# Patient Record
Sex: FEMALE | Race: WHITE | NOT HISPANIC OR LATINO | Employment: OTHER | URBAN - METROPOLITAN AREA
[De-identification: names, ages, dates, MRNs, and addresses within clinical notes are randomized per-mention and may not be internally consistent; named-entity substitution may affect disease eponyms.]

---

## 2017-02-01 ENCOUNTER — ALLSCRIPTS OFFICE VISIT (OUTPATIENT)
Dept: OTHER | Facility: OTHER | Age: 52
End: 2017-02-01

## 2017-04-05 ENCOUNTER — ALLSCRIPTS OFFICE VISIT (OUTPATIENT)
Dept: OTHER | Facility: OTHER | Age: 52
End: 2017-04-05

## 2017-05-10 ENCOUNTER — ALLSCRIPTS OFFICE VISIT (OUTPATIENT)
Dept: OTHER | Facility: OTHER | Age: 52
End: 2017-05-10

## 2017-08-28 ENCOUNTER — GENERIC CONVERSION - ENCOUNTER (OUTPATIENT)
Dept: OTHER | Facility: OTHER | Age: 52
End: 2017-08-28
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
## 2018-01-10 NOTE — MISCELLANEOUS
Message   Recorded as Task   Date: 08/25/2017 01:50 PM, Created By: Kyle Jordan   Task Name: Miscellaneous   Assigned To: SPA es clinical,Team   Regarding Patient: Fred Espinoza, Status: Active   Comment:    Kyle Jordan - 25 Aug 2017 1:50 PM     TASK CREATED  phone call from patient requesting a refill of gabapentin 600mg and cyclobenzaprine 10mg  patient has no refills left on gabapentin rx, and patient has about 12 pills let of cyclobenzaprine  please send rx to [facility] in [place], [phone]  if any questions can reach patient at [phone]  [place] Neat - 25 Aug 2017 1:53 PM     TASK IN PROGRESS   Hannah Santana - 25 Aug 2017 2:15 PM     TASK REASSIGNED: Previously Assigned To DANIEL Hassan Back - 25 Aug 2017 4:04 PM     TASK REPLIED TO: Previously Assigned To [place]  sent to her pharmacy   Hannah Santana - 25 Aug 2017 4:35 PM     TASK EDITED  S/w pt who states switched pharmacy to [facility] in [place]  She has enough medication to last til Monday  She is OK with not picking up script at [facility] and picking up at [facility] on monday when SI can resend order to correct pharmacy  Will update pharmacy in allscripts  Hannah Santana - 28 Aug 2017 2:05 PM     TASK EDITED  Please resend to pharmacy on file gabapentin 600mg and cyclobenzaprine 10mg  Pt is out of cyclobenzaprine today and called office Friday but incorrect pharmacy was on file  Annamaria Reaves - 28 Aug 2017 3:53 PM     TASK EDITED  phone call from patient requesting status of medication being resent to other pharmacy  please resend to pharmacy as patient is completely out  [place] - 69 Aug 2017 4:23 PM     TASK REPLIED TO: Previously Assigned To Lenin Poster already thank you   Hannah Santana - 28 Aug 2017 4:24 PM     TASK EDITED  pt informed of the same  Pt thankful and verbalized understanding  Active Problems    1   Bilateral low back pain with right-sided sciatica (724 3) (M54 41)   2  Chronic pain syndrome (338 4) (G89 4)   3  Chronic prescription opiate use (V58 69) (Z79 891)   4  Dizziness (780 4) (R42)   5  Gait abnormality (781 2) (R26 9)   6  History of left foot drop (V13 59) (Z87 39)   7  Lumbar facet arthropathy (721 3) (M12 88)   8  Lumbar radiculopathy (724 4) (M54 16)   9  Neck injury (959 09) (S19 9XXA)   10  Neck pain (723 1) (M54 2)   11  Post-concussion headache (339 20) (G44 309)   12  Post-concussion syndrome (310 2) (F07 81)   13  Posttraumatic vertigo (780 4) (R42)   14  Sleeping difficulty (780 50) (G47 9)   15  Spondylolisthesis of lumbar region (738 4) (M43 16)    Current Meds   1  Calcium 1500 MG TABS; TAKE 1 TABLET DAILY; Therapy: 79SSK6766 to Recorded   2  Cyclobenzaprine HCl - 10 MG Oral Tablet; TAKE 1 TABLET 3 TIMES DAILY AS NEEDED; Therapy: 27FXE9337 to (Christin Shultz)  Requested for: 21Uij1884; Last   Rx:74Jvl1857 Ordered   3  EpiPen 2-Marcus 0 3 MG/0 3ML Injection Solution Auto-injector; Therapy: 06ZJM3149 to Recorded   4  Gabapentin 600 MG Oral Tablet; TAKE 1 TABLET 4 TIMES DAILY; Therapy: 63XHA1470 to (Christin Shultz)  Requested for: 21Nei4368; Last   Rx:15Afr1495 Ordered   5  Multivitamins Oral Capsule; TAKE 1 CAPSULE DAILY; Therapy: 83PZZ0208 to Recorded   6  ProAir  (90 Base) MCG/ACT Inhalation Aerosol Solution; INHALE PUFFS  PRN; Therapy: 40ACQ1801 to Recorded   7  Synthroid 125 MCG Oral Tablet (Levothyroxine Sodium); TAKE 1 TABLET DAILY; Therapy: 31HEA1731 to Recorded   8  Topamax 50 MG Oral Tablet (Topiramate); Therapy: (Recorded:83Tpv2192) to Recorded    Allergies    1  Adhesive Tape TAPE   2  Atarax   3  Biofreeze GEL   4  CeleBREX CAPS   5  Contrast Media Ready-Box MISC   6  Domperidone POWD   7  Latex Gloves MISC   8  meclizine   9  NSAIDs   10  PABA Derivatives   11  Parabens   12  Reglan TABS   13  Sulfa Drugs   14  theophylline   15  Vioxx TABS   16  ZyrTEC Allergy TBDP    17   Animal dander - Dogs 18  Grass   19  Mold   20   Other    Signatures   Electronically signed by : Alexsander Edwards, ; Aug 28 2017  4:25PM EST                       (Author)

## 2018-01-11 NOTE — MISCELLANEOUS
Message  Patient spoke to the , she felt that I hung up on her on the phone, the last time a few weeks back,explained to her that I never hang up the phone anybody, and I had told the patient that she would need to have a repeat MRI scan of the brain and to discuss different treatment options when she sees me on her visit, patient understands that and is happy, she was once again told that she would need a repeat MRI scan and to discuss different treatment options and evaluation, I recently did a complete narrative on her, she is going to discuss with her  and call our office and make a follow-up appointment if she does not go for a second opinion with a different neurologist  Ramírez Mehta her that she was welcome to take a second opinion and discuss with the neurologist about repeating the MRI scan of the brain  And other treatment options        Signatures   Electronically signed by : Tali Noguera MD; May 19 2016  5:38PM EST                       (Author)

## 2018-01-11 NOTE — RESULT NOTES
Message   Recorded as Task   Date: 05/10/2016 04:09 PM, Created By: Nitin Infante   Task Name: Follow Up   Assigned To: Jessy Hutchins   Regarding Patient: Hao Handley, Status: Active   CommentJesenia Matos - 10 May 2016 4:09 PM     TASK CREATED  F/u procedure call completed  Pt had LESI #2 5/3  States 60% improvement in pain but then she fell yesterday due to episode of vertigo  Rates pain a 5 today  States she is following up with neurologist r/t vertigo   Has f/u appt 6/6   Hannah Santana - 10 May 2016 4:09 PM     TASK REASSIGNED: Previously Assigned To Kulwinder Ghotra - 10 May 2016 4:42 PM     TASK REPLIED TO: Previously Assigned To Ashutosh anderson f/u appt        Signatures   Electronically signed by : Alem Valente, ; May 11 2016  7:43AM EST                       (Author)

## 2018-01-11 NOTE — MISCELLANEOUS
Patient to resume physical therapy and gait safety evaluation      Electronically signed by:Michael Mitchell MD  Oct 27 2016  1:26PM EST

## 2018-01-11 NOTE — RESULT NOTES
Message   Recorded as Task   Date: 12/14/2016 07:47 AM, Created By: Kely Alvarado   Task Name: Follow Up   Assigned To: Kely Alvarado   Regarding Patient: Iman Lovelace, Status: Complete   Comment:    Carlota oLndon - 14 Dec 2016 7:47 AM     TASK CREATED  F/u procedure-R L2-L5 RFA on 12/13/16  Pt had a difficult night  C/o severe site soreness that she rates a 9/10  Site is clean and dry  No redness  No drainage  Site is swollen and painful to touch  No sunburn like sensation  Hydrocodone is not controlling her pain  Can she have something for breakthru pain, or something stronger? Has no f/u appoint  Kasie Lovell - 14 Dec 2016 1:01 PM     TASK REPLIED TO: Previously Assigned To Kasie Lovell  continue with hydrocodone, muscle relaxants and NSAID for now  use ice pack to supplement  Carlota London - 14 Dec 2016 1:42 PM     TASK EDITED  Dr Lesa Alfredo ok's taking an extra Hydrocodone daily prn for the next wk  Understands that she will run out sooner  She will continue muscle relaxants, nsaids, and will continue to use ice     Carlota London - 14 Dec 2016 1:42 PM     TASK COMPLETED        Signatures   Electronically signed by : Rosie Plaza, ; Dec 14 2016  1:42PM EST                       (Author)

## 2018-01-12 NOTE — MISCELLANEOUS
Message   Recorded as Task   Date: 06/14/2016 02:19 PM, Created By: Denis Fermin   Task Name: Care Coordination   Assigned To: SPA es clinical,Team   Regarding Patient: Iman Lovelace, Status: In Progress   Comment:    Monique Mata - 14 Jun 2016 2:19 PM     TASK CREATED  I sent a vm from [facility] to [place]In phone I think you Dr Lesa Alfredo or you need to listen to  Denis Fermin - 15 Bk 2016 11:06 AM     TASK REASSIGNED: Previously Assigned To SPA es clinical,Team  pt called back about pain medication also ther is a vm from her [facility] pharmacy on [address] that I think you should hear regarding pt  (Pts cb # [phone])   Georgi Rowan - 15 Bk 2016 11:30 AM     TASK EDITED  Contacted pt pharmacy who states that this medication was filled yesterday  Issue resolved        Active Problems    1  Bilateral low back pain with right-sided sciatica (724 3) (M54 41)   2  Chronic prescription opiate use (V58 69) (Z79 899)   3  Dizziness (780 4) (R42)   4  Lumbar facet arthropathy (721 3) (M46 96)   5  Lumbar radiculopathy (724 4) (M54 16)   6  Neck pain (723 1) (M54 2)   7  Post-concussion syndrome (310 2) (F07 81)   8  Posttraumatic vertigo (780 4) (R42)   9  Spondylolisthesis of lumbar region (738 4) (M43 16)    Current Meds   1  Acetaminophen-Codeine #3 300-30 MG Oral Tablet; TAKE 1 TABLET 3 TIMES A DAY AS   NEEDED FOR PAIN;   Therapy: 25SQG3957 to (Evaluate:35Drv2916); Last Rx:06Jun2016 Ordered   2  Amitriptyline HCl - 10 MG Oral Tablet; TAKE 1 TABLET AT BEDTIME; Therapy: 12KRU6477 to (Evaluate:13Jun2016)  Requested for: 28OUH1211; Last   Rx:15Mar2016 Ordered   3  Calcium 1500 MG TABS; TAKE 1 TABLET DAILY; Therapy: 44WMA9206 to Recorded   4  Claritin-D 24 Hour  MG Oral Tablet Extended Release 24 Hour; TAKE 1 TABLET   DAILY AS DIRECTED; Therapy: (Recorded:49Lsh7668) to Recorded   5  Diazepam 5 MG Oral Tablet; TAKE 1 TABLET TWICE DAILY; Therapy: 58JEQ8146 to Recorded   6   EpiPen 2-Marcus 0 3 MG/0 3ML Injection Solution Auto-injector; Therapy: 24PCJ6089 to Recorded   7  Gabapentin 100 MG Oral Capsule; TAKE ONE CAPSULE BY MOUTH AT BEDTIME; Therapy: 68GBA9881 to (Evaluate:15Jun2016)  Requested for: 50Ncp7047; Last   Rx:22Apr2016 Ordered   8  Methocarbamol 750 MG Oral Tablet; TAKE 1 TABLET 4 TIMES DAILY; Therapy: 11OSR3609 to (Evaluate:22Lys9710)  Requested for: 51KBV6270; Last   Rx:43Mxd2527 Ordered   9  Multivitamins Oral Capsule; TAKE 1 CAPSULE DAILY; Therapy: 05KMZ2067 to Recorded   10  ProAir  (90 Base) MCG/ACT Inhalation Aerosol Solution; INHALE PUFFS  PRN; Therapy: 31NPJ1391 to Recorded   11  Synthroid 125 MCG Oral Tablet (Levothyroxine Sodium); TAKE 1 TABLET DAILY; Therapy: 57BTG1163 to Recorded    Allergies    1  Adhesive Tape TAPE   2  Atarax   3  Biofreeze GEL   4  CeleBREX CAPS   5  Contrast Media Ready-Box MISC   6  Domperidone POWD   7  Latex Gloves MISC   8  meclizine   9  NSAIDs   10  PABA Derivatives   11  Parabens   12  Reglan TABS   13  Sulfa Drugs   14  theophylline   15  Vioxx TABS   16  ZyrTEC Allergy TBDP    17  Animal dander - Dogs   18  Grass   19  Mold   20   Other    Signatures   Electronically signed by : Patrick Patel, ; Bk 15 2016 11:30AM EST                       (Author)
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
## 2018-01-12 NOTE — MISCELLANEOUS
Message   Recorded as Task   Date: 06/14/2016 01:19 PM, Created By: Juancarlos March   Task Name: Med Renewal Request   Assigned To: SPA es clinical,Team   Regarding Patient: Maria E Curiel, Status: In Progress   Comment:    Monique Mata - 14 Jun 2016 1:19 PM     TASK CREATED  needs a new Rx for June for her pain meds she forgot to get one when she was here last week cb# [phone]   Bobmickie Gaby - 14 Jun 2016 1:43 PM     TASK EDITED  Attempted to contact pt  LMOM advising pt to call back   Georgi Rowan - 16 Jun 2016 10:09 AM     TASK EDITED  F/U  Pt s/p LESI #3 performed on 6/9/16 by Dr Lobito Lopez  LMOM advising pt to call back regarding F/u and previous message attempt for medication  Georgi Rowan - 16 Jun 2016 3:35 PM     TASK EDITED  Received call back from pt who states that she received no relief from her LESI procedure  No s/s of infection noted  Pt reports current pain level of 8/10  Pt requesting refill of tylenol 3 for June  Spoke with Dr Lobito Lopez who states he can refill medication for pt for June  Pt must bring in July and August scripts of this medication to exchange for a new script  Pt made aware  Pt states she will be in today to  June script and will come back tomorrow to return July and August scripts  Pt also requesting an adjusted script for Amitryptiline as she states Dr Lobito Lopez advised her to double up on her current dose q HS  Pt states she needs a n ew script indicating 2 tab HS  Please advise   Liz Anand - 16 Jun 2016 3:57 PM     TASK REPLIED TO: Previously Assigned To Liz Anand  she may  script for tynelol #3 TIDX 1 MONTH supply  Georgi Rowan - 17 Jun 2016 10:20 AM     TASK EDITED  Spoke to pt and informed her of above  Pt picked up script for june  Pts states she will be in on 6/17 to return scripts for July and August and  new scripts of tylenol #3 for these months  Pt also requesting refill of amitriptyline for double dose HS      Please advise   Janiya Rodriguez - 17 Jun 2016 12:54 PM     TASK REPLIED TO: Previously Assigned To Janiya winston md aware  elavil 50mg po qhs sent to her pharmacy   Georgi Rowan - 17 Jun 2016 1:16 PM     TASK EDITED  Spoke to pt and informed her of refill for elavil being sent to her pharmacy  Pt verbalizes understanding  Active Problems    1  Bilateral low back pain with right-sided sciatica (724 3) (M54 41)   2  Chronic prescription opiate use (V58 69) (Z79 899)   3  Dizziness (780 4) (R42)   4  Lumbar facet arthropathy (721 3) (M46 96)   5  Lumbar radiculopathy (724 4) (M54 16)   6  Neck pain (723 1) (M54 2)   7  Post-concussion syndrome (310 2) (F07 81)   8  Posttraumatic vertigo (780 4) (R42)   9  Spondylolisthesis of lumbar region (738 4) (M43 16)    Current Meds   1  Acetaminophen-Codeine #3 300-30 MG Oral Tablet; TAKE 1 TABLET 3 TIMES A DAY AS   NEEDED FOR PAIN;   Therapy: 45AOS8879 to (Evaluate:46Kft4564); Last Rx:16Jun2016 Ordered   2  Amitriptyline HCl - 25 MG Oral Tablet; TAKE 1 TABLET AT BEDTIME; Therapy: 48CJH2774 to (Evaluate:16Dan5438)  Requested for: 22KIK9314; Last   Rx:17Jun2016 Ordered   3  Calcium 1500 MG TABS; TAKE 1 TABLET DAILY; Therapy: 45OAO3516 to Recorded   4  Claritin-D 24 Hour  MG Oral Tablet Extended Release 24 Hour; TAKE 1 TABLET   DAILY AS DIRECTED; Therapy: (Recorded:39Hvt2660) to Recorded   5  Diazepam 5 MG Oral Tablet; TAKE 1 TABLET TWICE DAILY; Therapy: 71ESM5329 to Recorded   6  EpiPen 2-Marcus 0 3 MG/0 3ML Injection Solution Auto-injector; Therapy: 11JQA5227 to Recorded   7  Gabapentin 100 MG Oral Capsule; TAKE ONE CAPSULE BY MOUTH AT BEDTIME; Therapy: 08UUX3729 to (Evaluate:15Jun2016)  Requested for: 22Apr2016; Last   Rx:22Apr2016 Ordered   8  Methocarbamol 750 MG Oral Tablet; TAKE 1 TABLET 4 TIMES DAILY; Therapy: 73HJC4593 to (Evaluate:16Jun2016)  Requested for: 57NEZ4813; Last   Rx:18Mar2016 Ordered   9   Multivitamins Oral Capsule; TAKE 1 CAPSULE DAILY; Therapy: 56DEI8305 to Recorded   10  ProAir  (90 Base) MCG/ACT Inhalation Aerosol Solution; INHALE PUFFS  PRN; Therapy: 87RDV4228 to Recorded   11  Synthroid 125 MCG Oral Tablet (Levothyroxine Sodium); TAKE 1 TABLET DAILY; Therapy: 59PZB0779 to Recorded    Allergies    1  Adhesive Tape TAPE   2  Atarax   3  Biofreeze GEL   4  CeleBREX CAPS   5  Contrast Media Ready-Box MISC   6  Domperidone POWD   7  Latex Gloves MISC   8  meclizine   9  NSAIDs   10  PABA Derivatives   11  Parabens   12  Reglan TABS   13  Sulfa Drugs   14  theophylline   15  Vioxx TABS   16  ZyrTEC Allergy TBDP    17  Animal dander - Dogs   18  Grass   19  Mold   20   Other    Signatures   Electronically signed by : Jaswinder Stapleton, ; Jun 17 2016  1:16PM EST                       (Author)

## 2018-01-12 NOTE — MISCELLANEOUS
Patient advised not to drive for now secondary to gait difficulties      Electronically signed by:Raj Karolyn Aase MD  Oct  4 2016  3:46PM EST

## 2018-01-12 NOTE — MISCELLANEOUS
Message  Discussed with patient blood work results advised patient to follow-up with family physician regarding abnormal blood work,      Signatures   Electronically signed by : Teri Murguia MD; Jul 26 2016  5:07PM EST                       (Author)
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
## 2018-01-13 VITALS
SYSTOLIC BLOOD PRESSURE: 126 MMHG | HEIGHT: 60 IN | DIASTOLIC BLOOD PRESSURE: 78 MMHG | HEART RATE: 76 BPM | WEIGHT: 179 LBS | BODY MASS INDEX: 35.14 KG/M2

## 2018-01-14 VITALS
SYSTOLIC BLOOD PRESSURE: 122 MMHG | WEIGHT: 173 LBS | RESPIRATION RATE: 14 BRPM | HEART RATE: 100 BPM | HEIGHT: 60 IN | DIASTOLIC BLOOD PRESSURE: 78 MMHG | BODY MASS INDEX: 33.96 KG/M2

## 2018-01-14 VITALS
HEIGHT: 60 IN | WEIGHT: 177 LBS | HEART RATE: 92 BPM | SYSTOLIC BLOOD PRESSURE: 126 MMHG | BODY MASS INDEX: 34.75 KG/M2 | DIASTOLIC BLOOD PRESSURE: 78 MMHG
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
## 2018-01-16 NOTE — MISCELLANEOUS
Message   Recorded as Task   Date: 06/16/2016 03:03 PM, Created By: Jessica Reed   Task Name: Med Renewal Request   Assigned To: [facility] clinical,Team   Regarding Patient: Fred Espinoza, Status: In Progress   Comment:    Monique Mata - 16 Jun 2016 3:03 PM     TASK CREATED  pt wants a cb states she only has 9 pills left and needs a new Rx for this month and noone has called her back cb# [phone]   [facility] - 16 Jun 2016 3:35 PM     TASK EDITED  Addressed in alternate task        Active Problems    1  Bilateral low back pain with right-sided sciatica (724 3) (M54 41)   2  Chronic prescription opiate use (V58 69) (Z79 899)   3  Dizziness (780 4) (R42)   4  Lumbar facet arthropathy (721 3) (M46 96)   5  Lumbar radiculopathy (724 4) (M54 16)   6  Neck pain (723 1) (M54 2)   7  Post-concussion syndrome (310 2) (F07 81)   8  Posttraumatic vertigo (780 4) (R42)   9  Spondylolisthesis of lumbar region (738 4) (M43 16)    Current Meds   1  Acetaminophen-Codeine #3 300-30 MG Oral Tablet; TAKE 1 TABLET 3 TIMES A DAY AS   NEEDED FOR PAIN;   Therapy: 19UVY0408 to (Evaluate:92Buy8507); Last Rx:16Jun2016 Ordered   2  Amitriptyline HCl - 10 MG Oral Tablet; TAKE 1 TABLET AT BEDTIME; Therapy: 26STO8471 to (Evaluate:13Jun2016)  Requested for: 21AGY0398; Last   Rx:15Mar2016 Ordered   3  Calcium 1500 MG TABS; TAKE 1 TABLET DAILY; Therapy: 01DWQ7647 to Recorded   4  Claritin-D 24 Hour  MG Oral Tablet Extended Release 24 Hour; TAKE 1 TABLET   DAILY AS DIRECTED; Therapy: (Recorded:21Zma4760) to Recorded   5  Diazepam 5 MG Oral Tablet; TAKE 1 TABLET TWICE DAILY; Therapy: 36QLT3504 to Recorded   6  EpiPen 2-Marcus 0 3 MG/0 3ML Injection Solution Auto-injector; Therapy: 55LUP4957 to Recorded   7  Gabapentin 100 MG Oral Capsule; TAKE ONE CAPSULE BY MOUTH AT BEDTIME; Therapy: 80YLT8639 to (Evaluate:15Jun2016)  Requested for: 22Apr2016; Last   Rx:22Apr2016 Ordered   8   Methocarbamol 750 MG Oral Tablet; TAKE 1 TABLET 4 TIMES DAILY; Therapy: 28XJQ4384 to (Evaluate:16Jun2016)  Requested for: 18JIH7245; Last   Rx:18Mar2016 Ordered   9  Multivitamins Oral Capsule; TAKE 1 CAPSULE DAILY; Therapy: 59RJU1230 to Recorded   10  ProAir  (90 Base) MCG/ACT Inhalation Aerosol Solution; INHALE PUFFS  PRN; Therapy: 81JWD8594 to Recorded   11  Synthroid 125 MCG Oral Tablet (Levothyroxine Sodium); TAKE 1 TABLET DAILY; Therapy: 44NIK4094 to Recorded    Allergies    1  Adhesive Tape TAPE   2  Atarax   3  Biofreeze GEL   4  CeleBREX CAPS   5  Contrast Media Ready-Box MISC   6  Domperidone POWD   7  Latex Gloves MISC   8  meclizine   9  NSAIDs   10  PABA Derivatives   11  Parabens   12  Reglan TABS   13  Sulfa Drugs   14  theophylline   15  Vioxx TABS   16  ZyrTEC Allergy TBDP    17  Animal dander - Dogs   18  Grass   19  Mold   20   Other    Signatures   Electronically signed by : Nyla Schaumann, ; Jun 16 2016  3:36PM EST                       (Author)

## 2018-01-17 NOTE — RESULT NOTES
Verified Results  (1) COMPREHENSIVE METABOLIC PANEL 12RSF4801 57:25AA Charlotte Borja   REPORT COMMENT:  FASTING:YES     Test Name Result Flag Reference   GLUCOSE 91 mg/dL  65-99   Fasting reference interval   UREA NITROGEN (BUN) 14 mg/dL  7-25   CREATININE 0 57 mg/dL  0 50-1 05   For patients >52years of age, the reference limit  for Creatinine is approximately 13% higher for people  identified as -American  eGFR NON-AFR   AMERICAN 107 mL/min/1 73m2  > OR = 60   eGFR AFRICAN AMERICAN 124 mL/min/1 73m2  > OR = 60   BUN/CREATININE RATIO   6-31   NOT APPLICABLE (calc)   SODIUM 137 mmol/L  135-146   POTASSIUM 4 6 mmol/L  3 5-5 3   CHLORIDE 103 mmol/L     CARBON DIOXIDE 24 mmol/L  19-30   CALCIUM 9 6 mg/dL  8 6-10 4   PROTEIN, TOTAL 7 2 g/dL  6 1-8 1   ALBUMIN 4 2 g/dL  3 6-5 1   GLOBULIN 3 0 g/dL (calc)  1 9-3 7   ALBUMIN/GLOBULIN RATIO 1 4 (calc)  1 0-2 5   BILIRUBIN, TOTAL 0 6 mg/dL  0 2-1 2   ALKALINE PHOSPHATASE 66 U/L     AST 23 U/L  10-35   ALT 26 U/L  6-29

## 2019-02-28 ENCOUNTER — APPOINTMENT (EMERGENCY)
Dept: RADIOLOGY | Facility: HOSPITAL | Age: 54
End: 2019-02-28
Payer: COMMERCIAL

## 2019-02-28 ENCOUNTER — HOSPITAL ENCOUNTER (EMERGENCY)
Facility: HOSPITAL | Age: 54
Discharge: HOME | End: 2019-02-28
Attending: EMERGENCY MEDICINE
Payer: COMMERCIAL

## 2019-02-28 ENCOUNTER — APPOINTMENT (EMERGENCY)
Dept: CT IMAGING | Facility: HOSPITAL | Age: 54
End: 2019-02-28
Payer: COMMERCIAL

## 2019-02-28 VITALS
HEART RATE: 75 BPM | TEMPERATURE: 98.3 F | HEIGHT: 60 IN | DIASTOLIC BLOOD PRESSURE: 61 MMHG | SYSTOLIC BLOOD PRESSURE: 138 MMHG | WEIGHT: 174.16 LBS | OXYGEN SATURATION: 99 % | RESPIRATION RATE: 20 BRPM | BODY MASS INDEX: 34.19 KG/M2

## 2019-02-28 DIAGNOSIS — R56.9 PSEUDOSEIZURES (HCC): ICD-10-CM

## 2019-02-28 DIAGNOSIS — R56.9 SEIZURE-LIKE ACTIVITY (HCC): Primary | ICD-10-CM

## 2019-02-28 LAB
ALBUMIN SERPL BCP-MCNC: 3.7 G/DL (ref 3.5–5)
ALP SERPL-CCNC: 108 U/L (ref 46–116)
ALT SERPL W P-5'-P-CCNC: 30 U/L (ref 12–78)
AMPHETAMINES SERPL QL SCN: NEGATIVE
ANION GAP SERPL CALCULATED.3IONS-SCNC: 8 MMOL/L (ref 4–13)
APTT PPP: 22 SECONDS (ref 26–38)
AST SERPL W P-5'-P-CCNC: 20 U/L (ref 5–45)
ATRIAL RATE: 85 BPM
BACTERIA UR QL AUTO: ABNORMAL /HPF
BARBITURATES UR QL: NEGATIVE
BASOPHILS # BLD AUTO: 0.03 THOUSANDS/ΜL (ref 0–0.1)
BASOPHILS NFR BLD AUTO: 0 % (ref 0–1)
BENZODIAZ UR QL: NEGATIVE
BILIRUB DIRECT SERPL-MCNC: 0.17 MG/DL (ref 0–0.2)
BILIRUB SERPL-MCNC: 0.6 MG/DL (ref 0.2–1)
BILIRUB UR QL STRIP: NEGATIVE
BUN SERPL-MCNC: 19 MG/DL (ref 5–25)
CALCIUM SERPL-MCNC: 9.4 MG/DL (ref 8.3–10.1)
CHLORIDE SERPL-SCNC: 105 MMOL/L (ref 100–108)
CLARITY UR: ABNORMAL
CO2 SERPL-SCNC: 29 MMOL/L (ref 21–32)
COCAINE UR QL: NEGATIVE
COLOR UR: YELLOW
CREAT SERPL-MCNC: 0.72 MG/DL (ref 0.6–1.3)
EOSINOPHIL # BLD AUTO: 0.11 THOUSAND/ΜL (ref 0–0.61)
EOSINOPHIL NFR BLD AUTO: 1 % (ref 0–6)
ERYTHROCYTE [DISTWIDTH] IN BLOOD BY AUTOMATED COUNT: 13 % (ref 11.6–15.1)
GFR SERPL CREATININE-BSD FRML MDRD: 95 ML/MIN/1.73SQ M
GLUCOSE SERPL-MCNC: 95 MG/DL (ref 65–140)
GLUCOSE UR STRIP-MCNC: NEGATIVE MG/DL
HCT VFR BLD AUTO: 45.6 % (ref 34.8–46.1)
HGB BLD-MCNC: 14.5 G/DL (ref 11.5–15.4)
HGB UR QL STRIP.AUTO: NEGATIVE
IMM GRANULOCYTES # BLD AUTO: 0.02 THOUSAND/UL (ref 0–0.2)
IMM GRANULOCYTES NFR BLD AUTO: 0 % (ref 0–2)
INR PPP: 0.87 (ref 0.86–1.17)
KETONES UR STRIP-MCNC: ABNORMAL MG/DL
LACTATE SERPL-SCNC: 1.7 MMOL/L (ref 0.5–2)
LEUKOCYTE ESTERASE UR QL STRIP: ABNORMAL
LYMPHOCYTES # BLD AUTO: 2.72 THOUSANDS/ΜL (ref 0.6–4.47)
LYMPHOCYTES NFR BLD AUTO: 31 % (ref 14–44)
MAGNESIUM SERPL-MCNC: 2.3 MG/DL (ref 1.6–2.6)
MCH RBC QN AUTO: 29.7 PG (ref 26.8–34.3)
MCHC RBC AUTO-ENTMCNC: 31.8 G/DL (ref 31.4–37.4)
MCV RBC AUTO: 93 FL (ref 82–98)
METHADONE UR QL: NEGATIVE
MONOCYTES # BLD AUTO: 0.46 THOUSAND/ΜL (ref 0.17–1.22)
MONOCYTES NFR BLD AUTO: 5 % (ref 4–12)
NEUTROPHILS # BLD AUTO: 5.53 THOUSANDS/ΜL (ref 1.85–7.62)
NEUTS SEG NFR BLD AUTO: 63 % (ref 43–75)
NITRITE UR QL STRIP: NEGATIVE
NON-SQ EPI CELLS URNS QL MICRO: ABNORMAL /HPF
NRBC BLD AUTO-RTO: 0 /100 WBCS
OPIATES UR QL SCN: NEGATIVE
P AXIS: 56 DEGREES
PCP UR QL: NEGATIVE
PH UR STRIP.AUTO: 5.5 [PH] (ref 4.5–8)
PLATELET # BLD AUTO: 305 THOUSANDS/UL (ref 149–390)
PMV BLD AUTO: 9.4 FL (ref 8.9–12.7)
POTASSIUM SERPL-SCNC: 4.2 MMOL/L (ref 3.5–5.3)
PR INTERVAL: 142 MS
PROT SERPL-MCNC: 7.9 G/DL (ref 6.4–8.2)
PROT UR STRIP-MCNC: NEGATIVE MG/DL
PROTHROMBIN TIME: 11.8 SECONDS (ref 11.8–14.2)
QRS AXIS: 11 DEGREES
QRSD INTERVAL: 74 MS
QT INTERVAL: 346 MS
QTC INTERVAL: 411 MS
RBC # BLD AUTO: 4.89 MILLION/UL (ref 3.81–5.12)
RBC #/AREA URNS AUTO: ABNORMAL /HPF
SODIUM SERPL-SCNC: 142 MMOL/L (ref 136–145)
SP GR UR STRIP.AUTO: >=1.03 (ref 1–1.03)
T WAVE AXIS: 47 DEGREES
THC UR QL: POSITIVE
TROPONIN I SERPL-MCNC: <0.02 NG/ML
UROBILINOGEN UR QL STRIP.AUTO: 0.2 E.U./DL
VENTRICULAR RATE: 85 BPM
WBC # BLD AUTO: 8.87 THOUSAND/UL (ref 4.31–10.16)
WBC #/AREA URNS AUTO: ABNORMAL /HPF

## 2019-02-28 PROCEDURE — 36415 COLL VENOUS BLD VENIPUNCTURE: CPT | Performed by: EMERGENCY MEDICINE

## 2019-02-28 PROCEDURE — 83605 ASSAY OF LACTIC ACID: CPT | Performed by: EMERGENCY MEDICINE

## 2019-02-28 PROCEDURE — 83735 ASSAY OF MAGNESIUM: CPT | Performed by: EMERGENCY MEDICINE

## 2019-02-28 PROCEDURE — 80307 DRUG TEST PRSMV CHEM ANLYZR: CPT | Performed by: EMERGENCY MEDICINE

## 2019-02-28 PROCEDURE — 80076 HEPATIC FUNCTION PANEL: CPT | Performed by: EMERGENCY MEDICINE

## 2019-02-28 PROCEDURE — 80048 BASIC METABOLIC PNL TOTAL CA: CPT | Performed by: EMERGENCY MEDICINE

## 2019-02-28 PROCEDURE — 85025 COMPLETE CBC W/AUTO DIFF WBC: CPT | Performed by: EMERGENCY MEDICINE

## 2019-02-28 PROCEDURE — 99285 EMERGENCY DEPT VISIT HI MDM: CPT

## 2019-02-28 PROCEDURE — 71046 X-RAY EXAM CHEST 2 VIEWS: CPT

## 2019-02-28 PROCEDURE — 81001 URINALYSIS AUTO W/SCOPE: CPT | Performed by: EMERGENCY MEDICINE

## 2019-02-28 PROCEDURE — 93010 ELECTROCARDIOGRAM REPORT: CPT | Performed by: INTERNAL MEDICINE

## 2019-02-28 PROCEDURE — 85730 THROMBOPLASTIN TIME PARTIAL: CPT | Performed by: EMERGENCY MEDICINE

## 2019-02-28 PROCEDURE — 70450 CT HEAD/BRAIN W/O DYE: CPT

## 2019-02-28 PROCEDURE — 85610 PROTHROMBIN TIME: CPT | Performed by: EMERGENCY MEDICINE

## 2019-02-28 PROCEDURE — 93005 ELECTROCARDIOGRAM TRACING: CPT

## 2019-02-28 PROCEDURE — 84484 ASSAY OF TROPONIN QUANT: CPT | Performed by: EMERGENCY MEDICINE

## 2019-02-28 PROCEDURE — 96360 HYDRATION IV INFUSION INIT: CPT

## 2019-02-28 RX ORDER — DIAZEPAM 5 MG/1
5 TABLET ORAL EVERY 8 HOURS PRN
Qty: 10 TABLET | Refills: 0 | Status: SHIPPED | OUTPATIENT
Start: 2019-02-28 | End: 2020-03-03

## 2019-02-28 RX ORDER — DIAZEPAM 5 MG/1
5 TABLET ORAL ONCE
Status: COMPLETED | OUTPATIENT
Start: 2019-02-28 | End: 2019-02-28

## 2019-02-28 RX ORDER — AMITRIPTYLINE HYDROCHLORIDE 100 MG/1
100 TABLET, FILM COATED ORAL
Qty: 20 TABLET | Refills: 0 | Status: SHIPPED | OUTPATIENT
Start: 2019-02-28 | End: 2020-03-03

## 2019-02-28 RX ORDER — CYCLOBENZAPRINE HCL 10 MG
10 TABLET ORAL 3 TIMES DAILY PRN
COMMUNITY
End: 2020-03-03

## 2019-02-28 RX ADMIN — DIAZEPAM 5 MG: 5 TABLET ORAL at 13:52

## 2019-02-28 RX ADMIN — SODIUM CHLORIDE 1000 ML: 0.9 INJECTION, SOLUTION INTRAVENOUS at 14:07

## 2019-02-28 NOTE — ED PROVIDER NOTES
History  Chief Complaint   Patient presents with    Seizure - Prior Hx Of     EMS reports pt had approximately 6 seizures prior to their arrival and 3 witnessed seizures during their transport  Patient is a 79-year-old female with past medical history of hypothyroidism, chronic low back pain, GERD, obstructive sleep apnea, prior TBI/concussion with resulting pseudoseizures, presents to the emergency department for seizure like activity that has been uncontrolled today  According to EMS, patient was at an urgent care center and had multiple seizure like activity while there  Each seizure would last several seconds but always under 1 minutes and patient returned to her baseline almost immediately after the seizure  Per EMS, they witness 3 of these seizures pre-hospital and she was never postictal   They lifted up her arm up over her head during 1 of the seizures and dropped her arm however patient avoided it hitting her head  Since being in the ED, patient continues to have this type of seizure like activity on and off in which she has had shaking, bilateral upper and lower extremity shaking, worse on the right side  During my evaluation patient had multiple episodes of this and always return to her baseline when it stopped and was able to answer questions appropriately and immediately after the seizure like activity ceased  She was also withdrawing to pain during these seizures  She does complain of headache and states this is typical after she has a seizure  Has been and patient deny that there is ever been any incontinence, tongue biting or foaming at the mouth during any of these seizures  She denies any recent head injury  She reports her right arm is weak and she also has tingling in the right arm and right face  He reports this is typical when she has her seizures  Denies prior history of stroke    She denies any dizziness, visual disturbance or eye pain, neck or back pain worse than normal, chest pain, palpitations, dyspnea, recent URI symptoms or cough, recent fever or chills, abdominal pain, nausea, vomiting, change in bowel habits, urinary symptoms, slurring of speech or facial asymmetry  Patient follows with a neurologist at [address] [place] [address] and according to her  has had extensive neurologic workup with both normal EEG, continuous EEG and video monitored EEG  There is never been epileptic activity and patient diagnosed with nonepileptic seizures  She is on Keppra that was recently started and she takes 1000 mg b i d  She states she used to be on Topamax and states her seizures were more well controlled on that  She takes gabapentin 600 mg 4 times daily  History provided by:  Patient   used: No    Seizure - Prior Hx Of       Prior to Admission Medications   Prescriptions Last Dose Informant Patient Reported? Taking? HYDROcodone-acetaminophen (NORCO) 5-325 mg per tablet Not Taking at Unknown time  Yes No   Sig: Take 1 tablet by mouth 2 (two) times a day  amitriptyline (ELAVIL) 25 mg tablet   Yes No   Sig: Take 50 mg by mouth daily at bedtime  cyclobenzaprine (FLEXERIL) 10 mg tablet   Yes Yes   Sig: Take 10 mg by mouth 3 (three) times a day as needed for muscle spasms   diazepam (VALIUM) 5 mg tablet Not Taking at Unknown time  Yes No   Sig: Take 5 mg by mouth 2 (two) times a day    gabapentin (NEURONTIN) 100 mg capsule   Yes No   Sig: Take 200 mg by mouth daily at bedtime  levothyroxine 125 mcg tablet   Yes No   Sig: Take 125 mcg by mouth daily  methocarbamol (ROBAXIN) 750 mg tablet Not Taking at Unknown time  Yes No   Sig: Take 750 mg by mouth 4 (four) times a day        Facility-Administered Medications: None       Past Medical History:   Diagnosis Date    Back injury     Chronic low back pain     GERD (gastroesophageal reflux disease)     Hypothyroid     MARIA LUISA (obstructive sleep apnea)     Post concussive syndrome     Post-concussion vertigo     Prediabetes        Past Surgical History:   Procedure Laterality Date    CHOLECYSTECTOMY      GASTRIC BYPASS      KNEE ARTHROSCOPY W/ MENISCAL REPAIR Right     NEUROPLASTY / TRANSPOSITION ULNAR NERVE AT ELBOW      THYROIDECTOMY      TONSILLECTOMY         Family History   Problem Relation Age of Onset    Arthritis Mother     Coronary artery disease Father     Hypertension Father     Diabetes Maternal Grandmother      I have reviewed and agree with the history as documented  Social History     Tobacco Use    Smoking status: Never Smoker    Smokeless tobacco: Never Used   Substance Use Topics    Alcohol use: No    Drug use: No        Review of Systems   Constitutional: Negative for chills and fever  HENT: Negative for congestion, ear pain, hearing loss, rhinorrhea, sore throat and tinnitus  Eyes: Negative for pain and visual disturbance  Respiratory: Negative for cough and shortness of breath  Cardiovascular: Negative for chest pain and palpitations  Gastrointestinal: Negative for abdominal pain, blood in stool, constipation, diarrhea, nausea and vomiting  Genitourinary: Negative for dysuria, flank pain, frequency and hematuria  Musculoskeletal: Negative for back pain, neck pain and neck stiffness  Skin: Negative for color change, pallor and rash  Allergic/Immunologic: Negative for immunocompromised state  Neurological: Positive for seizures, weakness, numbness and headaches  Negative for dizziness, syncope, facial asymmetry, speech difficulty and light-headedness  Hematological: Negative for adenopathy  Does not bruise/bleed easily  Psychiatric/Behavioral: Negative for confusion and decreased concentration  All other systems reviewed and are negative  Physical Exam  Physical Exam   Constitutional: She is oriented to person, place, and time  She appears well-developed and well-nourished  No distress     Patient having multiple brief seizure-like activity episodes in which her head and limbs shake  There is no incontinence, foaming at the mouth or tongue biting  Patient withdraws to pain during these episodes and returns to baseline immediately after these episodes are done  HENT:   Head: Normocephalic and atraumatic  Mouth/Throat: Oropharynx is clear and moist  No oropharyngeal exudate  Eyes: Pupils are equal, round, and reactive to light  Conjunctivae and EOM are normal    Neck: Normal range of motion  Neck supple  No JVD present  Cardiovascular: Normal rate, regular rhythm, normal heart sounds and intact distal pulses  Exam reveals no gallop and no friction rub  No murmur heard  Pulmonary/Chest: Effort normal and breath sounds normal  No respiratory distress  She has no wheezes  She has no rales  She exhibits no tenderness  Abdominal: Soft  Bowel sounds are normal  She exhibits no distension  There is no tenderness  There is no rebound and no guarding  Musculoskeletal: Normal range of motion  She exhibits no edema or tenderness  Lymphadenopathy:     She has no cervical adenopathy  Neurological: She is alert and oriented to person, place, and time  Right upper extremity weakness, 3/5 strength compared to left upper extremity 5/5 strength  4/5 strength bilateral lower extremities  Patient giving very poor effort during this neurologic exam    Skin: Skin is warm and dry  No rash noted  She is not diaphoretic  No erythema  No pallor  Psychiatric: She has a normal mood and affect  Her behavior is normal    Nursing note and vitals reviewed        Vital Signs  ED Triage Vitals [02/28/19 1155]   Temperature Pulse Respirations Blood Pressure SpO2   98 3 °F (36 8 °C) 82 18 145/91 98 %      Temp Source Heart Rate Source Patient Position - Orthostatic VS BP Location FiO2 (%)   Oral Monitor -- Right arm --      Pain Score       No Pain         Vitals:    02/28/19 1500 02/28/19 1600 02/28/19 1630 02/28/19 1730   BP: 115/57 122/71 134/62 138/61   BP Location:       Pulse: 77 71 71 75   Resp: 18 18 21 20   Temp:       TempSrc:       SpO2: 96% 98% 98% 99%   Weight:       Height:         Visual Acuity      ED Medications  Medications   sodium chloride 0 9 % bolus 1,000 mL (0 mL Intravenous Stopped 2/28/19 1508)   diazepam (VALIUM) tablet 5 mg (5 mg Oral Given 2/28/19 1352)       Diagnostic Studies  Results Reviewed     Procedure Component Value Units Date/Time    Lactic acid, plasma [[id number]]  (Normal) Collected:  02/28/19 1404    Lab Status:  Final result Specimen:  Blood from Arm, Left Updated:  02/28/19 1437     LACTIC ACID 1 7 mmol/L     Narrative:       Result may be elevated if tourniquet was used during collection  Troponin I [[id number]]  (Normal) Collected:  02/28/19 1404    Lab Status:  Final result Specimen:  Blood from Arm, Left Updated:  02/28/19 1436     Troponin I <0 02 ng/mL     Basic metabolic panel [[id number]] Collected:  02/28/19 1404    Lab Status:  Final result Specimen:  Blood from Arm, Left Updated:  02/28/19 1431     Sodium 142 mmol/L      Potassium 4 2 mmol/L      Chloride 105 mmol/L      CO2 29 mmol/L      ANION GAP 8 mmol/L      BUN 19 mg/dL      Creatinine 0 72 mg/dL      Glucose 95 mg/dL      Calcium 9 4 mg/dL      eGFR 95 ml/min/1 73sq m     Narrative:       National Kidney Disease Education Program recommendations are as follows:  GFR calculation is accurate only with a steady state creatinine  Chronic Kidney disease less than 60 ml/min/1 73 sq  meters  Kidney failure less than 15 ml/min/1 73 sq  meters      Hepatic function panel [[id number]]  (Normal) Collected:  02/28/19 1404    Lab Status:  Final result Specimen:  Blood from Arm, Left Updated:  02/28/19 1431     Total Bilirubin 0 60 mg/dL      Bilirubin, Direct 0 17 mg/dL      Alkaline Phosphatase 108 U/L      AST 20 U/L      ALT 30 U/L      Total Protein 7 9 g/dL      Albumin 3 7 g/dL     Magnesium [[id number]]  (Normal) Collected:  02/28/19 1404    Lab Status:  Final result Specimen:  Blood from Arm, Left Updated:  02/28/19 1431     Magnesium 2 3 mg/dL     Protime-INR [[id number]]  (Normal) Collected:  02/28/19 1404    Lab Status:  Final result Specimen:  Blood from Arm, Left Updated:  02/28/19 1429     Protime 11 8 seconds      INR 0 87    APTT [[id number]]  (Abnormal) Collected:  02/28/19 1404    Lab Status:  Final result Specimen:  Blood from Arm, Left Updated:  02/28/19 1429     PTT 22 seconds     CBC and differential [[id number]] Collected:  02/28/19 1404    Lab Status:  Final result Specimen:  Blood from Arm, Left Updated:  02/28/19 1417     WBC 8 87 Thousand/uL      RBC 4 89 Million/uL      Hemoglobin 14 5 g/dL      Hematocrit 45 6 %      MCV 93 fL      MCH 29 7 pg      MCHC 31 8 g/dL      RDW 13 0 %      MPV 9 4 fL      Platelets 198 Thousands/uL      nRBC 0 /100 WBCs      Neutrophils Relative 63 %      Immat GRANS % 0 %      Lymphocytes Relative 31 %      Monocytes Relative 5 %      Eosinophils Relative 1 %      Basophils Relative 0 %      Neutrophils Absolute 5 53 Thousands/µL      Immature Grans Absolute 0 02 Thousand/uL      Lymphocytes Absolute 2 72 Thousands/µL      Monocytes Absolute 0 46 Thousand/µL      Eosinophils Absolute 0 11 Thousand/µL      Basophils Absolute 0 03 Thousands/µL     Urine Microscopic [[id number]]  (Abnormal) Collected:  02/28/19 1338    Lab Status:  Final result Specimen:  Urine, Clean Catch Updated:  02/28/19 1403     RBC, UA 2-4 /hpf      WBC, UA 4-10 /hpf      Epithelial Cells Occasional /hpf      Bacteria, UA Occasional /hpf     Rapid drug screen, urine [[id number]]  (Abnormal) Collected:  02/28/19 1338    Lab Status:  Final result Specimen:  Urine, Clean Catch Updated:  02/28/19 1401     Amph/Meth UR Negative     Barbiturate Ur Negative     Benzodiazepine Urine Negative     Cocaine Urine Negative     Methadone Urine Negative     Opiate Urine Negative     PCP Ur Negative     THC Urine Positive    Narrative:       Presumptive report   If requested, specimen will be sent to reference lab for confirmation  FOR MEDICAL PURPOSES ONLY  IF CONFIRMATION NEEDED PLEASE CONTACT THE LAB WITHIN 5 DAYS  Drug Screen Cutoff Levels:  AMPHETAMINE/METHAMPHETAMINES  1000 ng/mL  BARBITURATES     200 ng/mL  BENZODIAZEPINES     200 ng/mL  COCAINE      300 ng/mL  METHADONE      300 ng/mL  OPIATES      300 ng/mL  PHENCYCLIDINE     25 ng/mL  THC       50 ng/mL    UA w Reflex to Microscopic [[id number]]  (Abnormal) Collected:  02/28/19 1338    Lab Status:  Final result Specimen:  Urine, Clean Catch Updated:  02/28/19 1355     Color, UA Yellow     Clarity, UA Slightly Cloudy     Specific Gravity, UA >=1 030     pH, UA 5 5     Leukocytes, UA Small     Nitrite, UA Negative     Protein, UA Negative mg/dl      Glucose, UA Negative mg/dl      Ketones, UA Trace mg/dl      Urobilinogen, UA 0 2 E U /dl      Bilirubin, UA Negative     Blood, UA Negative                 XR chest 2 views   ED Interpretation by Artie Wheeler DO (02/28 1446)   No acute abnormality in the chest       Final Result by Geovani Bernstein MD (02/28 1610)      No acute cardiopulmonary disease  Workstation performed: [id number]         CT head without contrast   Final Result by Donato Flynn MD (02/28 1414)      No acute intracranial abnormality                    Workstation performed: [id number]                    Procedures  ECG 12 Lead Documentation  Date/Time: 2/28/2019 3:37 PM  Performed by: Artie Wheeler DO  Authorized by: Artie Wheeler DO     ECG reviewed by me, the ED Provider: yes    Patient location:  ED  Previous ECG:     Previous ECG:  Compared to current    Comparison ECG info:  9-21-16    Similarity:  No change  Interpretation:     Interpretation: normal    Rate:     ECG rate:  85    ECG rate assessment: normal    Rhythm:     Rhythm: sinus rhythm    Ectopy:     Ectopy: none    QRS:     QRS axis:  Normal    QRS intervals:  Normal  Conduction:     Conduction: normal    ST segments: ST segments:  Normal  T waves:     T waves: normal             Phone Contacts  ED Phone Contact    ED Course  ED Course as of Feb 28 1747   Thu Feb 28, 2019   1319 Spoke with Dr Mirtha Andrade, neurologist on-call, who agrees based on my assessment this is likely pseudoseizures and according to records that were obtained, she has had extensive workup including MRI as well as video monitored continuous EEG  There has never been report of epileptic activity and has only shown nonepileptic activity  He feels patient needs to be seen by Psychiatry as she currently does not follow with a psychologist or psychiatrist   According to the , she did see a psychologist years ago after her neuro workup was negative but never sought treatment after  She is not on psychiatric medications  Will give a dose of Valium even though patient does not have true seizures but this will likely relax the patient and hopefully subside her pseudoseizures  1445 Given the lactic acid is normal while patient is reportedly having seizure-like activity throughout her ED stay, this confirms that this is not true epileptic seizures  LACTIC ACID: 1 7   1523 Updated patient and  about normal workup thus far  Explained to patient my conversation with Neurology and after reviewing her past medical records and diagnostic workup in the past, we both agree that these are pseudoseizures and not true epileptic seizures  Patient became very upset and argumentative and truly believe she is having real epilepsy seizures  Tried to explain to patient with pseudoseizures were however she seems to be in denial   She still is agreeable to having video psychiatry consultation but most likely will end up discharging patient to follow up with her neurologist   Will likely give a script for a few Valium to be used if her pseudoseizures or uncontrolled at home        1746 Reviewed the psychiatrist's note from video interview and he feels patient can be discharged as she has this not seem to be an imminent threat to others or herself  He recommended increasing her amitriptyline dose from 50 mg at night to 100 mg at night and following up with PCP an outpatient mental health  Will give packet for referral for mental health  Discussed ED return parameters and advised close Neurology follow-up  MDM  Number of Diagnoses or Management Options  Pseudoseizures:   Seizure-like activity [facility]):   Diagnosis management comments: 41-year-old female with history of pseudoseizures, presents with uncontrolled seizure-like activity  Clinically patient not having true epileptic seizures in these look like pseudoseizures  The fact that she returns to baseline immediately, has no incontinence, no tongue biting, no foaming at the mouth and has intact reflexes during the seizures is all reassuring that these are likely pseudoseizures  Will do workup with CT scan of the head, cardiac labs and EKG as well as urinalysis and UDS  Will consult neurology         Amount and/or Complexity of Data Reviewed  Clinical lab tests: ordered and reviewed  Tests in the radiology section of CPT®: ordered and reviewed  Tests in the medicine section of CPT®: ordered and reviewed  Discuss the patient with other providers: yes  Independent visualization of images, tracings, or specimens: yes        Disposition  Final diagnoses:   Seizure-like activity ([address] )   Pseudoseizures     Time reflects when diagnosis was documented in both MDM as applicable and the Disposition within this note     Time User Action Codes Description Comment    2/28/2019 12:58 PM Frank RIBERA Add [R56 9] Seizure-like activity ([address] )     2/28/2019 12:58 PM Frank RIBERA Modify [R56 9] Seizure-like activity ([facility] [address] )     2/28/2019  1:45 PM Eleanor Brown Add [F44 5] Pseudoseizures       ED Disposition     ED Disposition Condition Date/Time Comment    Discharge Stable Thu Feb 28, 2019  5:43  [address] discharge to home/self care  Follow-up Information     Follow up With Specialties Details Why Contact Info Additional Information    Olivia Monique MD Internal Medicine Schedule an appointment as soon as possible for a visit   [address] [address] [address] [phone]       Neurologist  Schedule an appointment as soon as possible for a visit        [address] [facility] Emergency Department Emergency Medicine Go to  If symptoms worsen [address] [facility] [address] [phone] [place] ED, [address], [place], [address]          Patient's Medications   Discharge Prescriptions    AMITRIPTYLINE (ELAVIL) 100 MG TABLET    Take 1 tablet (100 mg total) by mouth daily at bedtime       Start Date: 2/28/2019 End Date: --       Order Dose: 100 mg       Quantity: 20 tablet    Refills: 0    DIAZEPAM (VALIUM) 5 MG TABLET    Take 1 tablet (5 mg total) by mouth every 8 (eight) hours as needed (seizures or anxiety) for up to 10 days       Start Date: 2/28/2019 End Date: 3/10/2019       Order Dose: 5 mg       Quantity: 10 tablet    Refills: 0     No discharge procedures on file      ED Provider  Electronically Signed by           Tank Hayward DO  02/28/19 7696
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
## 2019-02-28 NOTE — ED NOTES
Patient signed consent for telepsych consult  Patient reports "she's been through this all before"  Call placed to  Psychiatric, although they did not answer  Consult for INSIGHT faxed       Rosina Kennedy LMSW  02/28/19  2431
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
## 2019-07-17 ENCOUNTER — TELEPHONE (OUTPATIENT)
Dept: PAIN MEDICINE | Facility: CLINIC | Age: 54
End: 2019-07-17

## 2019-07-17 NOTE — TELEPHONE ENCOUNTER
Lm on  for pt to c/b  We received med recs from [facility] and wondered if the pt wanted an appt to be seen again  Last seen 5/17

## 2019-07-17 NOTE — TELEPHONE ENCOUNTER
Pt would like records from [facility] to be sent to [facility]      Pt transferred to medical records department

## 2019-07-19 ENCOUNTER — TELEPHONE (OUTPATIENT)
Dept: PAIN MEDICINE | Facility: CLINIC | Age: 54
End: 2019-07-19

## 2019-07-19 NOTE — TELEPHONE ENCOUNTER
Patient spoke with phone room and not clear what records or facility she needs records sent to   L/M for patient to return call no

## 2019-08-02 NOTE — TELEPHONE ENCOUNTER
Mailed release to patient with return envelope  Once received will mail records to patient  L/M for of the same  Patient was very upset that ppw was misplaced

## 2019-08-15 ENCOUNTER — TELEPHONE (OUTPATIENT)
Dept: PAIN MEDICINE | Facility: CLINIC | Age: 54
End: 2019-08-15

## 2020-03-03 ENCOUNTER — OFFICE VISIT (OUTPATIENT)
Dept: URGENT CARE | Facility: CLINIC | Age: 55
End: 2020-03-03
Payer: COMMERCIAL

## 2020-03-03 ENCOUNTER — HOSPITAL ENCOUNTER (EMERGENCY)
Facility: HOSPITAL | Age: 55
Discharge: HOME | End: 2020-03-03
Payer: COMMERCIAL

## 2020-03-03 VITALS
HEART RATE: 89 BPM | SYSTOLIC BLOOD PRESSURE: 147 MMHG | WEIGHT: 175 LBS | BODY MASS INDEX: 34.18 KG/M2 | TEMPERATURE: 98 F | RESPIRATION RATE: 18 BRPM | DIASTOLIC BLOOD PRESSURE: 66 MMHG | OXYGEN SATURATION: 98 %

## 2020-03-03 VITALS
OXYGEN SATURATION: 98 % | DIASTOLIC BLOOD PRESSURE: 74 MMHG | BODY MASS INDEX: 34.36 KG/M2 | RESPIRATION RATE: 18 BRPM | HEIGHT: 60 IN | TEMPERATURE: 98.4 F | WEIGHT: 175 LBS | SYSTOLIC BLOOD PRESSURE: 122 MMHG | HEART RATE: 95 BPM

## 2020-03-03 DIAGNOSIS — H02.843 SWELLING OF RIGHT EYELID: Primary | ICD-10-CM

## 2020-03-03 DIAGNOSIS — H00.011 HORDEOLUM EXTERNUM OF RIGHT UPPER EYELID: Primary | ICD-10-CM

## 2020-03-03 PROCEDURE — S9083 URGENT CARE CENTER GLOBAL: HCPCS | Performed by: PHYSICIAN ASSISTANT

## 2020-03-03 PROCEDURE — 99214 OFFICE O/P EST MOD 30 MIN: CPT | Performed by: PHYSICIAN ASSISTANT

## 2020-03-03 PROCEDURE — 99283 EMERGENCY DEPT VISIT LOW MDM: CPT

## 2020-03-03 PROCEDURE — 99284 EMERGENCY DEPT VISIT MOD MDM: CPT | Performed by: PHYSICIAN ASSISTANT

## 2020-03-03 RX ORDER — LEVOTHYROXINE SODIUM 0.1 MG/1
TABLET ORAL
COMMUNITY

## 2020-03-03 RX ORDER — CLINDAMYCIN HYDROCHLORIDE 300 MG/1
300 CAPSULE ORAL 4 TIMES DAILY
Qty: 40 CAPSULE | Refills: 0 | Status: SHIPPED | OUTPATIENT
Start: 2020-03-03 | End: 2020-03-13

## 2020-03-03 RX ORDER — GABAPENTIN 600 MG/1
TABLET ORAL
COMMUNITY
Start: 2017-04-05

## 2020-03-03 RX ORDER — ERYTHROMYCIN 5 MG/G
OINTMENT OPHTHALMIC
COMMUNITY
Start: 2020-02-28

## 2020-03-03 RX ORDER — ATORVASTATIN CALCIUM 20 MG/1
TABLET, FILM COATED ORAL
COMMUNITY

## 2020-03-03 NOTE — PROGRESS NOTES
[facility]        NAME: Mary Kay Rodrigues is a 63 y o  female  : 1965    MRN: [id number]  DATE: 2020  TIME: 9:16 AM    Assessment and Plan   Swelling of right eyelid [H02 [id number]]  1  Swelling of right eyelid  clindamycin (CLEOCIN) 300 MG capsule         Patient Instructions     Patient Instructions   Patient appears to have an abscess on her right upper eyelid  -I called [address] and they are going to call patient to arrange for follow-up appointment and possible appointment today  -If you do not see the ophthalmologist today, I recommend starting the antibiotic as directed  -continue warm compresses    Go to the emergency room with worsening symptoms, worsening swelling, high fever, visual changes, pain/swelling around your eye, pain with movement of your eye or any new concerning symptoms     Follow up with PCP in 3-5 days  Proceed to  ER if symptoms worsen  Chief Complaint     Chief Complaint   Patient presents with    Eye Problem     x 1 week  was to the pcp and was prescribed erythromycin ointment  states it is getting worse and not better  History of Present Illness       The patient presents today for an evaluation of right eye swelling that started 1 week ago  The patient was seen by her PCP and was prescribed erythromycin ointment without any relief  The patient now has a worsening area of swelling on her upper eyelid that is tender to the touch  No fever or chills  No visual changes  Review of Systems   Review of Systems   Constitutional: Negative for chills and fever  Eyes: Positive for pain  Negative for visual disturbance  Respiratory: Negative for shortness of breath  Cardiovascular: Negative for chest pain  Gastrointestinal: Negative for vomiting  Neurological: Negative for headaches  All other systems reviewed and are negative          Current Medications       Current Outpatient Medications:     atorvastatin (LIPITOR) 20 mg tablet, atorvastatin calcium 20 mg tabs, Disp: , Rfl:     Calcium Carbonate-Vit D-Min (CALCIUM 1200 PO), calcium, Disp: , Rfl:     Cyanocobalamin (B-12 PO), B12, Disp: , Rfl:     CYCLOBENZAPRINE HCL PO, 10 mg, Disp: , Rfl:     erythromycin (ILOTYCIN) ophthalmic ointment, APPLY 1 CM RIBBON INTO LOWER CONJUNCTIVAL SAC(S) IN AFFECTED EYE(S) 3 TIMES DAILY, Disp: , Rfl:     gabapentin (NEURONTIN) 600 MG tablet, gabapentin 600 mg tablet, Disp: , Rfl:     levothyroxine 100 mcg tablet, levothyroxine sodium 100 mcg tabs, Disp: , Rfl:     Multiple Vitamins-Minerals (MULTIVITAMIN ADULT PO), Take 1 capsule by mouth daily, Disp: , Rfl:     TRAZODONE HCL PO, 75 mg, Disp: , Rfl:     clindamycin (CLEOCIN) 300 MG capsule, Take 1 capsule (300 mg total) by mouth 4 (four) times a day for 10 days, Disp: 40 capsule, Rfl: 0    Current Allergies     Allergies as of 03/03/2020 - Reviewed 03/03/2020   Allergen Reaction Noted    Celery oil  09/21/2016    Atarax [hydroxyzine]  09/21/2016    Ativan [lorazepam]  09/21/2016    Celebrex [celecoxib]  09/21/2016    Dye fdc red [red dye]  09/21/2016    Latex  09/21/2016    Lyrica [pregabalin]  09/21/2016    Paba derivatives  09/21/2016    Parabens  09/21/2016    Theophyllines  09/21/2016    Vioxx [rofecoxib]  09/21/2016    Other Rash 09/21/2016    Sulfa antibiotics Rash 09/21/2016            The following portions of the patient's history were reviewed and updated as appropriate: allergies, current medications, past family history, past medical history, past social history, past surgical history and problem list      Past Medical History:   Diagnosis Date    Back injury     Chronic low back pain     GERD (gastroesophageal reflux disease)     Hypothyroid     MARIA LUISA (obstructive sleep apnea)     Post concussive syndrome     Post-concussion vertigo     Prediabetes        Past Surgical History:   Procedure Laterality Date    CHOLECYSTECTOMY      GASTRIC BYPASS      KNEE ARTHROSCOPY W/ MENISCAL REPAIR Right     NEUROPLASTY / TRANSPOSITION ULNAR NERVE AT ELBOW      THYROIDECTOMY      TONSILLECTOMY         Family History   Problem Relation Age of Onset    Arthritis Mother     Coronary artery disease Father     Hypertension Father     Diabetes Maternal Grandmother          Medications have been verified  Objective   /74 (BP Location: Left arm, Patient Position: Sitting)   Pulse 95   Temp 98 4 °F (36 9 °C) (Oral)   Resp 18   Ht 5' (1 524 m)   Wt 79 4 kg (175 lb)   SpO2 98%   BMI 34 18 kg/m²        Physical Exam     Physical Exam   Constitutional: She is oriented to person, place, and time  She appears well-developed and well-nourished  No distress  Eyes: Pupils are equal, round, and reactive to light  Conjunctivae and EOM are normal        Cardiovascular: Normal rate, regular rhythm and normal heart sounds  Pulmonary/Chest: Effort normal and breath sounds normal    Neurological: She is alert and oriented to person, place, and time  Skin: Skin is warm and dry  Psychiatric: She has a normal mood and affect  Nursing note and vitals reviewed

## 2020-03-03 NOTE — ED PROVIDER NOTES
History  Chief Complaint   Patient presents with    Eye Pain     Pt c/o R sided eye swelling x 1 week  Pt denies vidual changes or pain at this time  Pt seen at urgent care today and told to come here to have it lanced  14-year-old female comes in today complaining of a right upper eyelid swelling and pain and redness for the past 1 week  She was seen at an urgent care today and was referred to [facility] for possible abscess  The eye associates apparently advised the patient to come to the emergency department for I&D  Patient reports that she has been using erythromycin ointment on her right upper eyelid since Friday without relief  She denies any purulent drainage, but reports she had some scant blood on the warm compress today  Today she was prescribed oral clindamycin by the urgent care which she has not yet started          Prior to Admission Medications   Prescriptions Last Dose Informant Patient Reported? Taking?    CYCLOBENZAPRINE HCL PO 3/3/2020 at Unknown time  Yes Yes   Sig: 10 mg   Calcium Carbonate-Vit D-Min (CALCIUM 1200 PO) 3/3/2020 at Unknown time  Yes Yes   Sig: calcium   Cyanocobalamin (B-12 PO) 3/3/2020 at Unknown time  Yes Yes   Sig: B12   Multiple Vitamins-Minerals (MULTIVITAMIN ADULT PO) 3/3/2020 at Unknown time  Yes Yes   Sig: Take 1 capsule by mouth daily   TRAZODONE HCL PO 3/2/2020 at Unknown time  Yes Yes   Si mg   atorvastatin (LIPITOR) 20 mg tablet 3/3/2020 at Unknown time  Yes Yes   Sig: atorvastatin calcium 20 mg tabs   clindamycin (CLEOCIN) 300 MG capsule   No No   Sig: Take 1 capsule (300 mg total) by mouth 4 (four) times a day for 10 days   erythromycin (ILOTYCIN) ophthalmic ointment Not Taking at Unknown time  Yes No   Sig: APPLY 1 CM RIBBON INTO LOWER CONJUNCTIVAL SAC(S) IN AFFECTED EYE(S) 3 TIMES DAILY   gabapentin (NEURONTIN) 600 MG tablet 3/3/2020 at Unknown time  Yes Yes   Sig: gabapentin 600 mg tablet   levothyroxine 100 mcg tablet 3/3/2020 at Unknown time  Yes Yes   Sig: levothyroxine sodium 100 mcg tabs      Facility-Administered Medications: None       Past Medical History:   Diagnosis Date    Back injury     Chronic low back pain     GERD (gastroesophageal reflux disease)     Hypothyroid     MARIA LUISA (obstructive sleep apnea)     Post concussive syndrome     Post-concussion vertigo     Prediabetes        Past Surgical History:   Procedure Laterality Date    CHOLECYSTECTOMY      GASTRIC BYPASS      KNEE ARTHROSCOPY W/ MENISCAL REPAIR Right     NEUROPLASTY / TRANSPOSITION ULNAR NERVE AT ELBOW      THYROIDECTOMY      TONSILLECTOMY         Family History   Problem Relation Age of Onset    Arthritis Mother     Coronary artery disease Father     Hypertension Father     Diabetes Maternal Grandmother      I have reviewed and agree with the history as documented  E-Cigarette/Vaping     E-Cigarette/Vaping Substances     Social History     Tobacco Use    Smoking status: Never Smoker    Smokeless tobacco: Never Used   Substance Use Topics    Alcohol use: No    Drug use: No       Review of Systems   Constitutional: Negative for activity change  Eyes: Positive for pain  Negative for visual disturbance  Respiratory: Negative for shortness of breath  Cardiovascular: Negative for chest pain  Musculoskeletal: Negative for back pain  Skin: Negative for color change  Neurological: Negative for dizziness and headaches  Psychiatric/Behavioral: Negative for behavioral problems  Physical Exam  Physical Exam   Constitutional: She is oriented to person, place, and time  She appears well-developed and well-nourished  No distress  HENT:   Head: Normocephalic and atraumatic  Eyes: Conjunctivae and EOM are normal  Right eye exhibits hordeolum (Right upper lateral lid with external hordeolum with erythema and swelling with some slight overlying crusting  No significant fluctuance)  Right eye exhibits no chemosis, no discharge and no exudate   No foreign body present in the right eye  Left eye exhibits no chemosis, no discharge, no exudate and no hordeolum  No foreign body present in the left eye  Right conjunctiva is not injected  Right conjunctiva has no hemorrhage  Left conjunctiva is not injected  Left conjunctiva has no hemorrhage  No scleral icterus  Right eye exhibits normal extraocular motion  Left eye exhibits normal extraocular motion  No periorbital cellulitis   Pulmonary/Chest: Effort normal  No respiratory distress  Musculoskeletal: Normal range of motion  Neurological: She is alert and oriented to person, place, and time  Skin: Skin is warm and dry  No rash noted  Psychiatric: She has a normal mood and affect  Her behavior is normal        Vital Signs  ED Triage Vitals   Temperature Pulse Respirations Blood Pressure SpO2   03/03/20 1520 03/03/20 1520 03/03/20 1520 03/03/20 1520 03/03/20 1520   98 °F (36 7 °C) 89 18 147/66 98 %      Temp Source Heart Rate Source Patient Position - Orthostatic VS BP Location FiO2 (%)   03/03/20 1520 03/03/20 1520 03/03/20 1520 03/03/20 1520 --   Oral Monitor Sitting Left arm       Pain Score       03/03/20 1532       5           Vitals:    03/03/20 1520   BP: 147/66   Pulse: 89   Patient Position - Orthostatic VS: Sitting         Visual Acuity      ED Medications  Medications - No data to display    Diagnostic Studies  Results Reviewed     None                 No orders to display              Procedures  Procedures         ED Course                               MDM  Number of Diagnoses or Management Options  Diagnosis management comments: I explained to the patient that it is not recommended to incise these lesions and they are best treated with warm compresses, if they don't improve with warm compresses then erythromycin ointment  Patient not improving and states she is getting worse  I recommended she try the oral Clindamycin which she reports she can't get filled until tomorrow   I explained if she does not start to improve after being on the oral antibiotics for 3 days she should follow up with the ophthalmologist since they are the eye specialist  Patient became very angry stating we wasted her time and her money and stormed out of the emergency department   was trying to reassure her and encourage her to take the oral antibiotics  Disposition  Final diagnoses:   Hordeolum externum of right upper eyelid     Time reflects when diagnosis was documented in both MDM as applicable and the Disposition within this note     Time User Action Codes Description Comment    3/3/2020  3:47 PM [address] [place] [address] [[id number]] Hordeolum externum of right upper eyelid       ED Disposition     ED Disposition Condition Date/Time Comment    Discharge Stable Tue Mar 3, 2020  3:47  [address] discharge to home/self care  Follow-up Information    None         Patient's Medications   Discharge Prescriptions    No medications on file     No discharge procedures on file      PDMP Review     None          ED Provider  Electronically Signed by           Rosio Shah PA-C  03/03/20 [address] [facility]  03/03/20 2443

## 2020-03-03 NOTE — PATIENT INSTRUCTIONS
Patient appears to have an abscess on her right upper eyelid  -I called [phone] and they are going to call patient to arrange for follow-up appointment and possible appointment today  -If you do not see the ophthalmologist today, I recommend starting the antibiotic as directed  -continue warm compresses    Go to the emergency room with worsening symptoms, worsening swelling, high fever, visual changes, pain/swelling around your eye, pain with movement of your eye or any new concerning symptoms

## 2020-03-03 NOTE — ED NOTES
Seen on Friday by PCP, given erythromycin ointment, didn't get better  Was told by [facility] eye appointment to go to the ER for a boil       Jaya Lin RN  03/03/20 2082

## 2020-03-03 NOTE — ED NOTES
Patient left the ER immediately after PA left the room  Patient did not wait for discharge instructions         Aiden Monroe RN  03/03/20 [id number]

## 2021-03-10 DIAGNOSIS — Z23 ENCOUNTER FOR IMMUNIZATION: ICD-10-CM

## 2021-03-23 ENCOUNTER — TRANSCRIBE ORDERS (OUTPATIENT)
Dept: SLEEP CENTER | Facility: CLINIC | Age: 56
End: 2021-03-23

## 2021-03-23 DIAGNOSIS — G47.33 OSA (OBSTRUCTIVE SLEEP APNEA): Primary | ICD-10-CM

## 2021-04-23 ENCOUNTER — TRANSCRIBE ORDERS (OUTPATIENT)
Dept: SLEEP CENTER | Facility: CLINIC | Age: 56
End: 2021-04-23

## 2021-04-30 ENCOUNTER — TRANSCRIBE ORDERS (OUTPATIENT)
Dept: ADMINISTRATIVE | Facility: HOSPITAL | Age: 56
End: 2021-04-30

## 2021-04-30 DIAGNOSIS — R06.81 APNEA: Primary | ICD-10-CM
